# Patient Record
Sex: FEMALE | Race: OTHER | HISPANIC OR LATINO | URBAN - METROPOLITAN AREA
[De-identification: names, ages, dates, MRNs, and addresses within clinical notes are randomized per-mention and may not be internally consistent; named-entity substitution may affect disease eponyms.]

---

## 2020-05-12 ENCOUNTER — EMERGENCY (EMERGENCY)
Facility: HOSPITAL | Age: 60
LOS: 1 days | Discharge: HOME | End: 2020-05-12
Attending: EMERGENCY MEDICINE | Admitting: EMERGENCY MEDICINE
Payer: MEDICAID

## 2020-05-12 VITALS
OXYGEN SATURATION: 96 % | TEMPERATURE: 99 F | RESPIRATION RATE: 18 BRPM | DIASTOLIC BLOOD PRESSURE: 85 MMHG | SYSTOLIC BLOOD PRESSURE: 175 MMHG | HEART RATE: 98 BPM

## 2020-05-12 VITALS
OXYGEN SATURATION: 98 % | DIASTOLIC BLOOD PRESSURE: 71 MMHG | SYSTOLIC BLOOD PRESSURE: 140 MMHG | RESPIRATION RATE: 18 BRPM | HEART RATE: 90 BPM

## 2020-05-12 LAB
ALBUMIN SERPL ELPH-MCNC: 4.2 G/DL — SIGNIFICANT CHANGE UP (ref 3.3–5)
ALP SERPL-CCNC: 68 U/L — SIGNIFICANT CHANGE UP (ref 40–120)
ALT FLD-CCNC: 46 U/L — HIGH (ref 4–33)
ANION GAP SERPL CALC-SCNC: 10 MMO/L — SIGNIFICANT CHANGE UP (ref 7–14)
APTT BLD: 32.7 SEC — SIGNIFICANT CHANGE UP (ref 27.5–36.3)
AST SERPL-CCNC: 36 U/L — HIGH (ref 4–32)
BASOPHILS # BLD AUTO: 0.05 K/UL — SIGNIFICANT CHANGE UP (ref 0–0.2)
BASOPHILS NFR BLD AUTO: 0.8 % — SIGNIFICANT CHANGE UP (ref 0–2)
BILIRUB SERPL-MCNC: 0.8 MG/DL — SIGNIFICANT CHANGE UP (ref 0.2–1.2)
BUN SERPL-MCNC: 16 MG/DL — SIGNIFICANT CHANGE UP (ref 7–23)
CALCIUM SERPL-MCNC: 10.4 MG/DL — SIGNIFICANT CHANGE UP (ref 8.4–10.5)
CHLORIDE SERPL-SCNC: 101 MMOL/L — SIGNIFICANT CHANGE UP (ref 98–107)
CO2 SERPL-SCNC: 24 MMOL/L — SIGNIFICANT CHANGE UP (ref 22–31)
CREAT SERPL-MCNC: 0.55 MG/DL — SIGNIFICANT CHANGE UP (ref 0.5–1.3)
D DIMER BLD IA.RAPID-MCNC: < 150 NG/ML — SIGNIFICANT CHANGE UP
EOSINOPHIL # BLD AUTO: 0.11 K/UL — SIGNIFICANT CHANGE UP (ref 0–0.5)
EOSINOPHIL NFR BLD AUTO: 1.8 % — SIGNIFICANT CHANGE UP (ref 0–6)
GLUCOSE SERPL-MCNC: 266 MG/DL — HIGH (ref 70–99)
HCT VFR BLD CALC: 42.9 % — SIGNIFICANT CHANGE UP (ref 34.5–45)
HGB BLD-MCNC: 14.3 G/DL — SIGNIFICANT CHANGE UP (ref 11.5–15.5)
IMM GRANULOCYTES NFR BLD AUTO: 0.3 % — SIGNIFICANT CHANGE UP (ref 0–1.5)
INR BLD: 1.36 — HIGH (ref 0.88–1.17)
LYMPHOCYTES # BLD AUTO: 3.24 K/UL — SIGNIFICANT CHANGE UP (ref 1–3.3)
LYMPHOCYTES # BLD AUTO: 52.3 % — HIGH (ref 13–44)
MCHC RBC-ENTMCNC: 29.9 PG — SIGNIFICANT CHANGE UP (ref 27–34)
MCHC RBC-ENTMCNC: 33.3 % — SIGNIFICANT CHANGE UP (ref 32–36)
MCV RBC AUTO: 89.6 FL — SIGNIFICANT CHANGE UP (ref 80–100)
MONOCYTES # BLD AUTO: 0.39 K/UL — SIGNIFICANT CHANGE UP (ref 0–0.9)
MONOCYTES NFR BLD AUTO: 6.3 % — SIGNIFICANT CHANGE UP (ref 2–14)
NEUTROPHILS # BLD AUTO: 2.38 K/UL — SIGNIFICANT CHANGE UP (ref 1.8–7.4)
NEUTROPHILS NFR BLD AUTO: 38.5 % — LOW (ref 43–77)
NRBC # FLD: 0 K/UL — SIGNIFICANT CHANGE UP (ref 0–0)
PLATELET # BLD AUTO: 244 K/UL — SIGNIFICANT CHANGE UP (ref 150–400)
PMV BLD: 10.8 FL — SIGNIFICANT CHANGE UP (ref 7–13)
POTASSIUM SERPL-MCNC: 4.1 MMOL/L — SIGNIFICANT CHANGE UP (ref 3.5–5.3)
POTASSIUM SERPL-SCNC: 4.1 MMOL/L — SIGNIFICANT CHANGE UP (ref 3.5–5.3)
PROT SERPL-MCNC: 8.5 G/DL — HIGH (ref 6–8.3)
PROTHROM AB SERPL-ACNC: 15.8 SEC — HIGH (ref 9.8–13.1)
RBC # BLD: 4.79 M/UL — SIGNIFICANT CHANGE UP (ref 3.8–5.2)
RBC # FLD: 12.7 % — SIGNIFICANT CHANGE UP (ref 10.3–14.5)
SODIUM SERPL-SCNC: 135 MMOL/L — SIGNIFICANT CHANGE UP (ref 135–145)
TROPONIN T, HIGH SENSITIVITY: < 6 NG/L — SIGNIFICANT CHANGE UP (ref ?–14)
WBC # BLD: 6.19 K/UL — SIGNIFICANT CHANGE UP (ref 3.8–10.5)
WBC # FLD AUTO: 6.19 K/UL — SIGNIFICANT CHANGE UP (ref 3.8–10.5)

## 2020-05-12 PROCEDURE — 71045 X-RAY EXAM CHEST 1 VIEW: CPT | Mod: 26

## 2020-05-12 PROCEDURE — 99284 EMERGENCY DEPT VISIT MOD MDM: CPT

## 2020-05-12 RX ORDER — IBUPROFEN 200 MG
600 TABLET ORAL ONCE
Refills: 0 | Status: COMPLETED | OUTPATIENT
Start: 2020-05-12 | End: 2020-05-12

## 2020-05-12 RX ADMIN — Medication 600 MILLIGRAM(S): at 15:16

## 2020-05-12 NOTE — ED ADULT NURSE NOTE - OBJECTIVE STATEMENT
Break Shift RN: Pt received to [facility] presents with CP. Pt primarily Sammar[place] speaking, reports previously being diagnosed with COVID and PNA. Pt reports chest discomfort has not gone away, rates pain as 4/10. 20g placed in rt arm, labs drawn and sent. Pt a&ox3 and ambulatory at baseline, skin intact, respirations even and unlabored. Chest X-ray being performed at bedside, will continue to monitor.

## 2020-05-12 NOTE — ED ADULT NURSE NOTE - NSIMPLEMENTINTERV_GEN_ALL_ED
Implemented All Universal Safety Interventions:  [place] to call system. Call bell, personal items and telephone within reach. Instruct patient to call for assistance. Room bathroom lighting operational. Non-slip footwear when patient is off stretcher. Physically safe environment: no spills, clutter or unnecessary equipment. Stretcher in lowest position, wheels locked, appropriate side rails in place.

## 2020-05-12 NOTE — ED PROVIDER NOTE - ATTENDING CONTRIBUTION TO CARE
Pt was seen and evaluated by me. Pt is a 61 y/o female with PMhx of type 2 DM who presented to the ED for SOB X 2 days. Pt states over the past 2 days having SOB and chest discomfort. Pt admits to previously having COVID PNA. Pt denies any fever, chills, nausea, vomiting, or abd pain. Lungs CTA b/l. RRR. Abd soft, non-tender. No LE swelling or calf tenderness.  Concern for post-inflammatory changes/PNA/URI/PE  Labs, EKG, CXR, Analgesia

## 2020-05-12 NOTE — ED PROVIDER NOTE - PATIENT PORTAL LINK FT
You can access the FollowMyHealth Patient Portal offered by [facility] by registering at the following website: [email or web address]. By joining [employer]’s FollowMyHealth portal, you will also be able to view your health information using other applications (apps) compatible with our system.

## 2020-05-12 NOTE — ED PROVIDER NOTE - OBJECTIVE STATEMENT
59 y/o female with PMhx of type 2 DM who presented to the ED for SOB X 2 days. Pt states over the past 2 days having SOB and chest discomfort. Pt admits to previously having COVID PNA. Pt denies any fever, chills, nausea, vomiting, or abd pain.

## 2020-05-12 NOTE — ED ADULT TRIAGE NOTE - CHIEF COMPLAINT QUOTE
was previously dx with COVID PNA reports current chest pain and chest congestion. no active cough or SOB noted

## 2020-05-12 NOTE — ED PROVIDER NOTE - CLINICAL SUMMARY MEDICAL DECISION MAKING FREE TEXT BOX
61 y/o female with PMhx of type 2 DM who presented to the ED for SOB X 2 days.   Concern for post-inflammatory changes/PNA/URI/PE  Labs, EKG, CXR, Analgesia

## 2020-05-12 NOTE — ED PROVIDER NOTE - NSFOLLOWUPINSTRUCTIONS_ED_ALL_ED_FT
Usted fue visto en el departamento de emergencias por falta de aliento probablemente causada por tener COVID-19 anteriormente.    Sally un seguimiento con hidalgo médico de atención primaria en las próximas 24-48 horas.    Complete hidalgo ciclo de antibióticos incluso si jabier síntomas mejoran.    Si tiene algún síntoma que empeora, dolor de pecho severo, dolor de howard o falta de aliento, regrese al departamento de emergencias.    You were seen in the emergency department for shortness of breath likely caused by previously having COVID-19.   Please follow-up with your primary care doctor in the next 24-48 hours.   Please complete your course of antibiotics even if your symptoms improve.   If you have any worsening symptoms, severe chest pain, headache or shortness of breath, please return to the emergency department.

## 2020-05-12 NOTE — ED PROVIDER NOTE - PROGRESS NOTE DETAILS
updated patient on results using  service.  patient feels well. has antibiotics, an inhaler and montelukast prescribed by her primary care doctor. gave patient return precautions including severe chest pain and shortness of breath. will discharge with pcp follow-up. - resident Sukh Parham spoke with patient's daughter over the phone and answered all questions. - resident Sukh Parham

## 2020-11-15 ENCOUNTER — EMERGENCY (EMERGENCY)
Facility: HOSPITAL | Age: 60
LOS: 1 days | Discharge: HOME | End: 2020-11-15
Attending: EMERGENCY MEDICINE | Admitting: EMERGENCY MEDICINE
Payer: MEDICAID

## 2020-11-15 VITALS
HEART RATE: 77 BPM | RESPIRATION RATE: 18 BRPM | SYSTOLIC BLOOD PRESSURE: 142 MMHG | TEMPERATURE: 98 F | OXYGEN SATURATION: 98 % | DIASTOLIC BLOOD PRESSURE: 86 MMHG

## 2020-11-15 VITALS
OXYGEN SATURATION: 100 % | TEMPERATURE: 97 F | RESPIRATION RATE: 18 BRPM | HEART RATE: 91 BPM | SYSTOLIC BLOOD PRESSURE: 153 MMHG | DIASTOLIC BLOOD PRESSURE: 84 MMHG

## 2020-11-15 PROBLEM — E11.9 TYPE 2 DIABETES MELLITUS WITHOUT COMPLICATIONS: Chronic | Status: ACTIVE | Noted: 2020-05-12

## 2020-11-15 PROCEDURE — 73030 X-RAY EXAM OF SHOULDER: CPT | Mod: 26,RT

## 2020-11-15 PROCEDURE — 99283 EMERGENCY DEPT VISIT LOW MDM: CPT

## 2020-11-15 RX ORDER — LIDOCAINE 4 G/100G
1 CREAM TOPICAL ONCE
Refills: 0 | Status: COMPLETED | OUTPATIENT
Start: 2020-11-15 | End: 2020-11-15

## 2020-11-15 RX ORDER — IBUPROFEN 200 MG
600 TABLET ORAL ONCE
Refills: 0 | Status: COMPLETED | OUTPATIENT
Start: 2020-11-15 | End: 2020-11-15

## 2020-11-15 RX ADMIN — LIDOCAINE 1 PATCH: 4 CREAM TOPICAL at 11:03

## 2020-11-15 RX ADMIN — Medication 600 MILLIGRAM(S): at 11:03

## 2020-11-15 NOTE — ED PROVIDER NOTE - PROGRESS NOTE DETAILS
ADITYA Moore: xray shows no acute fractures. will dc home with analgesics and ortho follow up. Strict return precautions.

## 2020-11-15 NOTE — ED PROVIDER NOTE - NSFOLLOWUPINSTRUCTIONS_ED_ALL_ED_FT
Please take Ibuprofen 600mg every 6 hours for pain control.  Range of motion of the right shoulder is recommended.  See referral attached for follow up with Orthopedic.  Please return to the emergency department immediately, if you have any new, worsened or concerning symptoms.

## 2020-11-15 NOTE — ED PROVIDER NOTE - GENITOURINARY NEGATIVE STATEMENT, MLM
Pt notified    ----- Message from Shahid Oreilly MD sent at 8/13/2020  1:43 PM CDT -----  Please notify pt of normal lab results     no dysuria, no frequency, and no hematuria.

## 2020-11-15 NOTE — ED ADULT NURSE NOTE - OBJECTIVE STATEMENT
Pt. is A&Ox3 presents to ER c/o right shoulder pain. Pt. states 2 months ago, she pulled a muscle holding luggage. Pt. followed up with ortho, pt. states "they did not do any scan they just referred me to PT". Pt. has been going to physical therapy sessions, this past Wednesday has been experiencing constant sharp pain. Pt. respirations even and unlabored, abdomen nontender and nondistended, skin intact. Pt. can not lift right arm past hip. Safety precautions obtained, will CTM. Pt. is A&Ox3 presents to ER c/o right shoulder pain. Pt. states 2 months ago, she pulled a muscle holding luggage. Pt. followed up with ortho, pt. states "they did not do any scan they just referred me to PT". Pt. has been going to physical therapy sessions, this past Wednesday has been experiencing constant sharp pain. Pt. states history of DM type 2 and HLD. Pt. respirations even and unlabored, abdomen nontender and nondistended, skin intact. Pt. can not lift right arm past hip. Safety precautions obtained, will CTM.

## 2020-11-15 NOTE — ED PROVIDER NOTE - PATIENT PORTAL LINK FT
You can access the FollowMyHealth Patient Portal offered by [facility] by registering at the following website: [email or web address]. By joining [employer]’s FollowMyHealth portal, you will also be able to view your health information using other applications (apps) compatible with our system.

## 2020-11-15 NOTE — ED ADULT TRIAGE NOTE - CHIEF COMPLAINT QUOTE
pt reports shoulder injury from carrying luggage 2 months ago, already saw an orthopedic and has been to PT however pain getting worse. denies new injuries

## 2020-11-15 NOTE — ED PROVIDER NOTE - CLINICAL SUMMARY MEDICAL DECISION MAKING FREE TEXT BOX
60 year old female with PMH of type II DM presents to the ED for worsening RT shoulder pain for 3 months.  On physical exam, patient presented with limited ROM and pain with right shoulder abduction.  HD stable. Ordered 2 view RT shoulder x-ray with ibuprofen and lidocaine patch for pain. Will reassess 60 year old female with PMH of type II DM presents to the ED for worsening RT shoulder pain for 3 months.  On physical exam, limited ROM and pain with right shoulder abduction.  HD stable. Ordered 2 view RT shoulder x-ray with ibuprofen and lidocaine patch for pain. Will reassess 60 year old female with PMH of type II DM presents to the ED for worsening RT shoulder pain for 3 months.  On physical exam, limited ROM and pain with right shoulder abduction. Concern for possible frozen shoulder, no clinical evidence of neurovascular compromised.  Plan for rt shoulder x-ray with ibuprofen and lidocaine patch for pain. Will reassess

## 2020-11-15 NOTE — ED PROVIDER NOTE - OBJECTIVE STATEMENT
60 year old female with PMH of type II DM presents to the ED for worsening RT shoulder pain for 3 months.  Patient states that the pain started when she internally rotated her arm as she was carrying groceries 3 months ago. Last night, she was sleeping when the pain woke her up from sleep.  She took 2 pills of Tylenol with little alleviation of pain.  She states that the pain worsens with overhead movements. She denies fevers, night sweats, chest pain, SOB, numbness, tingling, and weakness.

## 2020-11-15 NOTE — ED PROVIDER NOTE - ATTENDING CONTRIBUTION TO CARE
Dr. Pollard:  I performed a face to face bedside interview with patient regarding history of present illness, review of symptoms and past medical history. I completed an independent physical exam.  I have discussed patient's plan of care with PA.   I agree with note as stated above, having amended the EMR as needed to reflect my findings.   This includes HISTORY OF PRESENT ILLNESS, HIV, PAST MEDICAL/SURGICAL/FAMILY/SOCIAL HISTORY, ALLERGIES AND HOME MEDICATIONS, REVIEW OF SYSTEMS, PHYSICAL EXAM, and any PROGRESS NOTES during the time I functioned as the attending physician for this patient.    60F h/o DM presents with right shoulder pain.  Pt initially "twisted her arm" in August, had seen an orthopedics doctor, was sent to physical therapy.  Recently, went to PT a few days ago and since then, pain seems to have been triggered.  Denies weakness/numbness.    Exam:  - nad  - rrr  - ctab  - +pain with ROM, limited abduction at right shoulder, neurovascularly intact distally    A/P  - right shoulder pain, suspect adhesive capsulitis vs other rotator cuff injury  - XR shoulder to r/o occult fracture

## 2024-05-13 NOTE — ED PROVIDER NOTE - MUSCULOSKELETAL [+], MLM
Daughter Veronica called Preeti COTTRELL looking for dosing instructions following 5 day hold for bladder procedure today. Pt instructed by urology to resume warfarin today; instructed to Veronica to resume normal dose and retest in four days on Friday.   
JOINT PAIN/LIMITED RANGE OF MOTION/RT shoulder